# Patient Record
Sex: MALE | Race: BLACK OR AFRICAN AMERICAN | NOT HISPANIC OR LATINO | Employment: FULL TIME | URBAN - METROPOLITAN AREA
[De-identification: names, ages, dates, MRNs, and addresses within clinical notes are randomized per-mention and may not be internally consistent; named-entity substitution may affect disease eponyms.]

---

## 2023-07-27 ENCOUNTER — APPOINTMENT (OUTPATIENT)
Dept: GENERAL RADIOLOGY | Facility: CLINIC | Age: 38
End: 2023-07-27
Payer: COMMERCIAL

## 2023-07-27 ENCOUNTER — HOSPITAL ENCOUNTER (EMERGENCY)
Facility: CLINIC | Age: 38
Discharge: HOME | End: 2023-07-27
Payer: COMMERCIAL

## 2023-07-27 VITALS
HEART RATE: 66 BPM | WEIGHT: 210 LBS | OXYGEN SATURATION: 99 % | RESPIRATION RATE: 16 BRPM | DIASTOLIC BLOOD PRESSURE: 81 MMHG | BODY MASS INDEX: 30.06 KG/M2 | SYSTOLIC BLOOD PRESSURE: 145 MMHG | HEIGHT: 70 IN | TEMPERATURE: 98.1 F

## 2023-07-27 DIAGNOSIS — R07.89 LEFT-SIDED CHEST WALL PAIN: ICD-10-CM

## 2023-07-27 DIAGNOSIS — Z72.0 TOBACCO ABUSE: ICD-10-CM

## 2023-07-27 LAB
ANION GAP SERPL CALCULATED.3IONS-SCNC: 9 MMOL/L (ref 7–15)
ATRIAL RATE - MUSE: 59 BPM
BASOPHILS # BLD AUTO: 0.1 10E3/UL (ref 0–0.2)
BASOPHILS NFR BLD AUTO: 1 %
BUN SERPL-MCNC: 9.9 MG/DL (ref 6–20)
CALCIUM SERPL-MCNC: 8.9 MG/DL (ref 8.6–10)
CHLORIDE SERPL-SCNC: 101 MMOL/L (ref 98–107)
CREAT SERPL-MCNC: 0.91 MG/DL (ref 0.67–1.17)
DEPRECATED HCO3 PLAS-SCNC: 26 MMOL/L (ref 22–29)
DIASTOLIC BLOOD PRESSURE - MUSE: NORMAL MMHG
EOSINOPHIL # BLD AUTO: 0.1 10E3/UL (ref 0–0.7)
EOSINOPHIL NFR BLD AUTO: 2 %
ERYTHROCYTE [DISTWIDTH] IN BLOOD BY AUTOMATED COUNT: 12.8 % (ref 10–15)
GFR SERPL CREATININE-BSD FRML MDRD: >90 ML/MIN/1.73M2
GLUCOSE SERPL-MCNC: 103 MG/DL (ref 70–99)
HCT VFR BLD AUTO: 46.9 % (ref 40–53)
HGB BLD-MCNC: 15.6 G/DL (ref 13.3–17.7)
HOLD SPECIMEN: NORMAL
HOLD SPECIMEN: NORMAL
IMM GRANULOCYTES # BLD: 0 10E3/UL
IMM GRANULOCYTES NFR BLD: 1 %
INTERPRETATION ECG - MUSE: NORMAL
LYMPHOCYTES # BLD AUTO: 2.5 10E3/UL (ref 0.8–5.3)
LYMPHOCYTES NFR BLD AUTO: 38 %
MCH RBC QN AUTO: 29.8 PG (ref 26.5–33)
MCHC RBC AUTO-ENTMCNC: 33.3 G/DL (ref 31.5–36.5)
MCV RBC AUTO: 90 FL (ref 78–100)
MONOCYTES # BLD AUTO: 0.5 10E3/UL (ref 0–1.3)
MONOCYTES NFR BLD AUTO: 8 %
NEUTROPHILS # BLD AUTO: 3.2 10E3/UL (ref 1.6–8.3)
NEUTROPHILS NFR BLD AUTO: 50 %
NRBC # BLD AUTO: 0 10E3/UL
NRBC BLD AUTO-RTO: 0 /100
P AXIS - MUSE: 57 DEGREES
PLATELET # BLD AUTO: 220 10E3/UL (ref 150–450)
POTASSIUM SERPL-SCNC: 4.1 MMOL/L (ref 3.4–5.3)
PR INTERVAL - MUSE: 188 MS
QRS DURATION - MUSE: 90 MS
QT - MUSE: 412 MS
QTC - MUSE: 407 MS
R AXIS - MUSE: 64 DEGREES
RBC # BLD AUTO: 5.23 10E6/UL (ref 4.4–5.9)
SODIUM SERPL-SCNC: 136 MMOL/L (ref 136–145)
SYSTOLIC BLOOD PRESSURE - MUSE: NORMAL MMHG
T AXIS - MUSE: 58 DEGREES
TROPONIN T SERPL HS-MCNC: <6 NG/L
VENTRICULAR RATE- MUSE: 59 BPM
WBC # BLD AUTO: 6.5 10E3/UL (ref 4–11)

## 2023-07-27 PROCEDURE — 84484 ASSAY OF TROPONIN QUANT: CPT

## 2023-07-27 PROCEDURE — 93005 ELECTROCARDIOGRAM TRACING: CPT

## 2023-07-27 PROCEDURE — 36415 COLL VENOUS BLD VENIPUNCTURE: CPT | Performed by: EMERGENCY MEDICINE

## 2023-07-27 PROCEDURE — 84484 ASSAY OF TROPONIN QUANT: CPT | Performed by: EMERGENCY MEDICINE

## 2023-07-27 PROCEDURE — 99285 EMERGENCY DEPT VISIT HI MDM: CPT | Mod: 25

## 2023-07-27 PROCEDURE — 80048 BASIC METABOLIC PNL TOTAL CA: CPT | Performed by: EMERGENCY MEDICINE

## 2023-07-27 PROCEDURE — 85025 COMPLETE CBC W/AUTO DIFF WBC: CPT | Performed by: EMERGENCY MEDICINE

## 2023-07-27 PROCEDURE — 71046 X-RAY EXAM CHEST 2 VIEWS: CPT

## 2023-07-27 PROCEDURE — 250N000013 HC RX MED GY IP 250 OP 250 PS 637

## 2023-07-27 PROCEDURE — 80048 BASIC METABOLIC PNL TOTAL CA: CPT

## 2023-07-27 PROCEDURE — 85025 COMPLETE CBC W/AUTO DIFF WBC: CPT

## 2023-07-27 RX ORDER — ACETAMINOPHEN 325 MG/1
650 TABLET ORAL ONCE
Status: COMPLETED | OUTPATIENT
Start: 2023-07-27 | End: 2023-07-27

## 2023-07-27 RX ADMIN — ACETAMINOPHEN 650 MG: 325 TABLET ORAL at 18:33

## 2023-07-27 ASSESSMENT — ACTIVITIES OF DAILY LIVING (ADL): ADLS_ACUITY_SCORE: 35

## 2023-07-27 NOTE — ED PROVIDER NOTES
"  History   Chief Complaint:  Chest Pain    HPI   Fady Strickland is a 38 year old male presenting to the emergency department for evaluation of chest pain.  Fady reports left-sided chest pain onset two days ago. His pain has been constant since onset and exacerbates with touching his left chest and with lifting/movement.  He is a smoker and states he has a chronic smoker's cough.  Denies any new or worsening cough or fevers. Denies pain radiation to the jaw, back or arms.  No diaphoresis, nausea, vomiting, abdominal discomfort.  Denies personal and family history of blood clots. Denies recent long distance travel, immobilization, and surgeries.  Not more short of breath than his baseline as a smoker.  Fady reports that he does not have a primary care provider as he frequently moves for work. [place] and [place] work tomorrow.     Independent Historian:   None - Patient Only    Review of External Notes:   None    Medications:    Albuterol - Fady reports that he misplaced his inhaler so he is no longer taking it.     Past Medical History:    Chronic bronchitis   Hypertension     Physical Exam     Patient Vitals for the past 24 hrs:   BP Temp Temp src Pulse Resp SpO2 Height Weight   07/27/23 1900 -- -- -- -- -- 99 % -- --   07/27/23 1527 (!) 145/81 98.1  F (36.7  C) Temporal 66 16 98 % 1.778 m (5' 10\") 95.3 kg (210 lb)     Physical Exam  General: Nontoxic-appearing adult male sitting in exam room.  HENT:   Head: Atraumatic.  Mouth/Throat: Oropharynx clear and moist.  Eyes: Conjunctiva and EOM normal.   Neck: Normal ROM. No rigidity.  CV: Regular rate and rhythm. Normal S1, S2. No appreciable murmurs.  Resp: Lungs clear to auscultation bilaterally. Normal respiratory effort. No stridor or cough observed.  GI: Bowel sounds normal. Abdomen soft, non distended and nontender. No rebound or guarding.  MSK: Patient has reproducible left-sided chest wall tenderness.  Skin: Warm and dry.  No " rashes.  Neuro: Awake, alert, oriented x 3.  Psych: Normal mood and affect.      Emergency Department Course   ECG  ECG taken at 1539  Sinus bradycardia   Otherwise normal ECG   Rate 59 bpm. [place] interval 188 ms. QRS duration 90 ms. QT/QTc 412/407 ms. P-R-T axes 57 64 58.     Imaging:  Chest XR,  PA & LAT   Final Result   IMPRESSION: Lungs are clear. Heart and pulmonary vascularity are normal. No signs of acute disease.         Report per radiology    Laboratory:  Labs Ordered and Resulted from Time of ED Arrival to Time of ED Departure   BASIC METABOLIC PANEL - Abnormal       Result Value    Sodium 136      Potassium 4.1      Chloride 101      Carbon Dioxide (CO2) 26      Anion Gap 9      Urea Nitrogen 9.9      Creatinine 0.91      Calcium 8.9      Glucose 103 (*)     GFR Estimate >90     TROPONIN T, HIGH SENSITIVITY - Normal    Troponin T, High Sensitivity <6     CBC WITH PLATELETS AND DIFFERENTIAL    WBC Count 6.5      RBC Count 5.23      Hemoglobin 15.6      Hematocrit 46.9      MCV 90      MCH 29.8      MCHC 33.3      RDW 12.8      Platelet Count 220      % Neutrophils 50      % Lymphocytes 38      % Monocytes 8      % Eosinophils 2      % Basophils 1      % Immature Granulocytes 1      NRBCs per 100 WBC 0      Absolute Neutrophils 3.2      Absolute Lymphocytes 2.5      Absolute Monocytes 0.5      Absolute Eosinophils 0.1      Absolute Basophils 0.1      Absolute Immature Granulocytes 0.0      Absolute NRBCs 0.0       Emergency Department Course & Assessments:    Interventions:  Medications   acetaminophen (TYLENOL) tablet 650 mg (650 mg Oral $Given 7/27/23 2630)      Assessments:  1745 I obtained history and examined the patient as noted above.    1925 I rechecked the patient. I discussed findings and discharge with the patient. All questions answered.      Independent Interpretation (X-rays, CTs, rhythm strip):  X-ray with no pneumothorax, widened mediastinum, cardiomegaly, or  infiltrate.    Consultations/Discussion of Management or Tests:  None     Social Determinants of Health affecting care:   None    Disposition:  The patient was discharged to home.     Impression & Plan      Medical Decision Making:  Fady is a 38-year-old male smoker with a history of chronic bronchitis and hypertension who came into the emergency department for evaluation of left chest discomfort over the past couple days per HPI above.  On arrival here he has afebrile and nontoxic-appearing.  Initial EKG showing  sinus rhythm.  Troponin ruled him out for myocardial infarction.  CBC with no anemia or leukocytosis.  Metabolic panel without any acute electrolyte derangement or acute kidney injury.  Patient is PERC negative.  Chest x-ray with no pneumothorax, widened mediastinum, cardiomegaly, pleural effusion or pneumonia.  On exam, patient did have reproducible chest wall tenderness.  He does a great deal of [place] and I think he is likely experiencing musculoskeletal strain.  I recommended Tylenol and ibuprofen as well as warm packs.  He does not have a primary care physician so I provided him with a referral.  Discussed return precautions and provided these in writing to him.  Provided him a work note as well.  At this juncture he is appropriate for discharge home with a low risk heart score per below:  History: Slightly suspicious  EKG: No ischemic changes  Age: 38  Risk factors: Smoking, hypertension  Initial troponin: Normal  Low risk       Diagnosis:    ICD-10-CM    1. Left-sided chest wall pain  R07.89 Primary Care Referral      2. Tobacco abuse  Z72.0 Primary Care Referral        Scribe Disclosure:  DEANA, Cari Mello & Sheela Lucas, am serving as a scribe at 5:53 PM on 7/27/2023 to document services personally performed by Suzi Mao PA-C based on my observations and the provider's statements to me.     7/27/2023   Suzi Mao PA-C Dewing, Jennifer C,  MARY  07/27/23 1932

## 2023-07-27 NOTE — Clinical Note
Fady Strickland was seen and treated in our emergency department on 7/27/2023.  He may return to work on 07/29/2023.  The patient was seen in the emergency department and found to have musculoskeletal strain, worse with lifting. It would be beneficial for him to refrain from heavy lifting/straining while healing.     If you have any questions or concerns, please don't hesitate to call.      Suzi Mao PA-C

## 2023-07-27 NOTE — DISCHARGE INSTRUCTIONS
Discharge Instructions  Chest Pain    You have been seen today for chest pain or discomfort.  At this time, your provider has found no signs that your chest pain is due to a serious or life-threatening condition, (or you have declined more testing and/or admission to the hospital). However, sometimes there is a serious problem that does not show up right away. Your evaluation today may not be complete and you may need further testing and evaluation.     Generally, every Emergency Department visit should have a follow-up clinic visit with either a primary or a specialty clinic/provider. Please follow-up as instructed by your emergency provider today.  Return to the Emergency Department if:  Your chest pain changes, gets worse, starts to happen more often, or comes with less activity.  You are newly short of breath.  You get very weak or tired.  You pass out or faint.  You have any new symptoms, like fever, cough, numb legs, or you cough up blood.  You have anything else that worries you.    Until you follow-up with your regular provider, please do the following:  Take one aspirin daily unless you have an allergy or are told not to by your provider.  If a stress test appointment has been made, go to the appointment.  If you have questions, contact your regular provider.  Follow-up with your regular provider/clinic as directed; this is very important.    If you were given a prescription for medicine here today, be sure to read all of the information (including the package insert) that comes with your prescription.  This will include important information about the medicine, its side effects, and any warnings that you need to know about.  The pharmacist who fills the prescription can provide more information and answer questions you may have about the medicine.  If you have questions or concerns that the pharmacist cannot address, please call or return to the Emergency Department.       Remember that you can always come  back to the Emergency Department if you are not able to see your regular provider in the amount of time listed above, if you get any new symptoms, or if there is anything that worries you.  Discharge Instructions  Tobacco Cessation    During your visit today to the Emergency Department, we learned that you use tobacco and we want to encourage you to quit. Tobacco use is the leading cause of preventable death in the [place]. Tobacco use increases risk for lung disease (emphysema or COPD), heart disease (heart attack), cancer, stroke, and many other diseases. Tobacco cessation is the process of quitting.    Nicotine is the main addictive substance in tobacco. If you use tobacco regularly, there is a good chance that you are addicted to nicotine. If you stop using tobacco, your body will miss having nicotine and this results in withdrawal. Common withdrawal symptoms include irritability, feeling sad, difficulty sleeping, craving cigarettes/tobacco, difficulty concentrating, being restless, and being hungry. Nicotine withdrawal is unpleasant but is not dangerous.    What kind of medical treatments are available?  There are many treatments to help with quitting. These include:  Nicotine replacement with patches, gum, lozenges, inhalers, and nasal spray.  Medications including bupropion (Zyban or Wellbutrin) and varenicline (Chantix) which help with nicotine craving and withdrawal symptoms.  Counseling with a behavioral health professional to help manage stress and develop coping mechanisms.    What can I do to help myself?  Some good strategies to get started are:  Reduce: While quitting is the best for your health, just reducing the amount that you smoke (or chew) can reduce your risk of cancer and other disease.  Understand your triggers: Triggers are things that remind you of using tobacco or make you want to use tobacco. Triggers can be emotional (like stress), patterns (like smoking when you drink or smoking  after a meal), social (like going to a bar), or withdrawal (craving or other withdrawal symptoms).  Have a plan: a smoking cessation plan can make it easier to quit.  Pick a date: Set a date to quit smoking.  Find a friend: Participating in a group with other smokers who are trying to change can be a big help.  Get support: Quitting is easier when the people in your life support you. Let them know that you plan to quit and ask them to help you.    Where can I learn more?  Go to smokefree.gov or quitpartnermn.[place].    What should I do now?  A good first step is to visit your primary care physician/provider to talk more about tobacco cessation.

## 2023-07-27 NOTE — ED TRIAGE NOTES
Pt presents by [facility] for complaints of L sided CP for the past 2 days. 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 Pt given 324 ASA by EMS      Triage Assessment       Row Name 07/27/23 1514       Triage Assessment (Adult)    Airway WDL WDL       Respiratory WDL    Respiratory WDL WDL       Skin Circulation/Temperature WDL    Skin Circulation/Temperature WDL WDL       Cardiac WDL    Cardiac WDL X  L sided CP, worse for the past 2 days       Peripheral/Neurovascular WDL    Peripheral Neurovascular WDL WDL       Cognitive/Neuro/Behavioral WDL    Cognitive/Neuro/Behavioral WDL WDL